# Patient Record
(demographics unavailable — no encounter records)

---

## 2024-10-14 NOTE — HISTORY OF PRESENT ILLNESS
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  Chronic intermittent right hip and groin pain for years now but has been essentially constant for the last 3 months. Pain is described as a sharp pain in the right groin radiating to medial and anterior thigh sharp 10/10 exacerbated by movement, weight bearing, applies cold packs, APAP which helps a little.  + electric sensation from hip down to foot    She is seeing chiropractor for lower back pain.  She has previously seen Dr. Good who ordered MRI of the lumbar spine which was completed at [facility] radiology but has not followed up with it.  Allergies:  Reglan  Prior medications: Current medications : Nifedipine, Carvedilol, diazepam, Ozempic, Pantoprazole , prednisone, gabapentin 400mg in am and 600mg q hs, APAP 500mg 2x/day   Social Hx : She is single and lives in [place].  She has 32 y/o daughter. Retired disability- [place] [employer], as well as  BI.  Denies illivits, etoh, tobacco.

## 2024-10-14 NOTE — ASSESSMENT
[FreeTextEntry1] : 66-year-old female with multiple medical comorbidities including anxiety, aortic stenosis, obstructive sleep apnea, AVN of the right hip status post right total hip replacement, ESRD status post renal transplant, ulnar artery aneurysm status postsurgical repair with ulnar neuropathy who presents for initial evaluation of chronic pain in lower back as well as right groin and anterior thigh.  On physical exam patient with right lower lumbar and sacral paraspinal muscle tenderness with spasm and trigger points.  Negative straight leg raise.   Discussed in detail the nature of chronic pain as well as treatment options including nonopioid pain management PT, therapeutic massage, stretching, exercise program, acupuncture, CBT as well as topical medications, non-opioid pain medications, Trigger point injections, SYD.  -Obtained prior MRI of the lumbar spine from [facility] which showed marked central canal stenosis and moderate bilateral foraminal stenosis L2-L3 through L5-S1 -Given complex medical history as well as above physical exam findings recommend conservative management at this time -Recommend physical therapy as outpatient, avoid NSAIDs -Taking gabapentin 400 mg in a.m. and 600 mg at bedtime without significant improvement in her symptoms, consider possibly tapering off May consider trial of duloxetine Will consider trigger point injections Recommend follow-up with Dr. Good -Will consider referral to general neurology for neuropathy evaluation as well as possible EMG of lower extremities to rule out lumbar radiculopathy   The patient had the opportunity to ask questions and all were answered to their satisfaction.  The patient verbalized understanding of the management plan and agreed with our recommendations.

## 2024-10-14 NOTE — DATA REVIEWED
[FreeTextEntry1] : MRI of lumbar spine March 17, 2024 performed at [facility] radiology: Impression:  1.  Grade 1 listhesis at L5-S1.  2. Disc bulges from L2-L3 through L5-S1 with marked central canal stenosis and moderate bilateral foraminal stenosis. 3.  Epidural lipomatosis contributes to central canal stenosis L4-5 and L5-S1

## 2024-10-14 NOTE — REVIEW OF SYSTEMS
[Arthralgias] : arthralgias [Joint Pain] : joint pain [Joint Stiffness] : joint stiffness [Lower Back Pain] : lower back pain [As Noted in HPI] : as noted in HPI [Negative] : Heme/Lymph

## 2024-10-14 NOTE — PHYSICAL EXAM
[General Appearance - Alert] : alert [General Appearance - Well Nourished] : well nourished [General Appearance - Well Developed] : well developed [Oriented To Time, Place, And Person] : oriented to person, place, and time [Cranial Nerves Optic (II)] : visual acuity intact bilaterally,  visual fields full to confrontation, pupils equal round and reactive to light [Cranial Nerves Oculomotor (III)] : extraocular motion intact [Cranial Nerves Trigeminal (V)] : facial sensation intact symmetrically [Cranial Nerves Facial (VII)] : face symmetrical [Cranial Nerves Vestibulocochlear (VIII)] : hearing was intact bilaterally [Cranial Nerves Glossopharyngeal (IX)] : tongue and palate midline [Cranial Nerves Hypoglossal (XII)] : there was no tongue deviation with protrusion [Cranial Nerves Accessory (XI - Cranial And Spinal)] : head turning and shoulder shrug symmetric [Motor Tone] : muscle tone was normal in all four extremities [Motor Handedness Right-Handed] : the patient is right hand dominant [Sclera] : the sclera and conjunctiva were normal [Outer Ear] : the ears and nose were normal in appearance [Neck Appearance] : the appearance of the neck was normal [Respiration, Rhythm And Depth] : normal respiratory rhythm and effort [Heart Rate And Rhythm] : heart rate was normal and rhythm regular [Motor Strength Lower Extremities Bilaterally] : strength was normal in both lower extremities [FreeTextEntry6] : Right upper extremity weakness secondary to?  Effort predominant digit abduction weakness [FreeTextEntry7] : Decreased sensation to pinprick light touch right ulnar distribution, bilateral lower extremities inconsistent [FreeTextEntry1] : Antalgic gait with forward flexed posture requiring use of rolling walker

## 2024-12-05 NOTE — PHYSICAL EXAM
[General Appearance - Alert] : alert [General Appearance - Well Nourished] : well nourished [General Appearance - Well Developed] : well developed [Sclera] : the sclera and conjunctiva were normal [PERRL With Normal Accommodation] : pupils were equal in size, round, and reactive to light [Extraocular Movements] : extraocular movements were intact [Outer Ear] : the ears and nose were normal in appearance [Hearing Threshold Finger Rub Not [place]] : hearing was normal [Examination Of The Oral Cavity] : the lips and gums were normal [Neck Appearance] : the appearance of the neck was normal [Neck Cervical Mass (___cm)] : no neck mass was observed [Heart Rate And Rhythm] : heart rate was normal and rhythm regular [Heart Sounds] : normal S1 and S2 [Full Pulse] : the pedal pulses are present [Edema] : there was no peripheral edema [Bowel Sounds] : normal bowel sounds [Abdomen Soft] : soft [Abdomen Tenderness] : non-tender [Cervical Lymph Nodes Enlarged Posterior Bilaterally] : posterior cervical [Cervical Lymph Nodes Enlarged Anterior Bilaterally] : anterior cervical [Supraclavicular Lymph Nodes Enlarged Bilaterally] : supraclavicular [Abnormal Walk] : normal gait [Nail Clubbing] : no clubbing  or cyanosis of the fingernails [Musculoskeletal - Swelling] : no joint swelling seen [Skin Color & Pigmentation] : normal skin color and pigmentation [] : no rash [Skin Lesions] : no skin lesions [Cranial Nerves] : cranial nerves 2-12 were intact [No Focal Deficits] : no focal deficits [Oriented To Time, Place, And Person] : oriented to person, place, and time [Impaired Insight] : insight and judgment were intact [Mood] : the mood was normal [FreeTextEntry1] : right arm ulnar aspect with staples

## 2024-12-05 NOTE — PLAN
[FreeTextEntry1] : 1.  Renal transplant - kidney functioning well at time of discharge, creatinine about 1.5 at baseline 2.  Immunosuppression - thymo induction, target Envarsus 4-6, MMF 500mgs BID and pred 2.5.   3.  HTN - Resume coreg but at 12.5mgs BID and asked pt to take nifedipine at the same time per day.   4.  Ulnar artery aneurysm - s/p repair by vascular.  has seen vascular and s/p surgery in [facility].  Pain has improved.   5.  epigastric pain - has sig history of bleeding ulcers.  Pain has improved.  Cont protonix BID and carafate.  She has seen GI and had a f/u EGD and colonoscopy.   6.  Headache - has seen neurology.  On B6 and magnesium.  Possibly worse due to ? sildenafil.   7.  Fatigue/ MOORE -  Pt saw Dr. Alegre.  She is on sildenafil and instructed to start CPAP.  She is reluctant to ligation of AVF right now but if her symptoms do not improve she will get it done.      Breathing has improved 8.  Obesity/ DM - started ozempic, stopped due to constipation.  Then Mounjaro but due to GI intolerance stopped them both.   9.  Hip pain - following with ortho.  Has history of hip surgery on the same side in the past.  Pain has not improved.  Going to see someone in [facility].    She will be scheduling a f/u with Dr. Wadsworth.    f/u with transplant in 4 months.

## 2024-12-05 NOTE — CONSULT LETTER
[Dear  ___] : Dear  [unfilled], [Consult Letter:] : I had the pleasure of evaluating your patient, [unfilled]. [Please see my note below.] : Please see my note below. [Consult Closing:] : Thank you very much for allowing me to participate in the care of this patient.  If you have any questions, please do not hesitate to contact me. [Sincerely,] : Sincerely, [FreeTextEntry3] : Galdino Kimbrough, DO Initial (On Arrival)

## 2024-12-05 NOTE — HISTORY OF PRESENT ILLNESS
[ Donor] :  donor [Thymoglobulin] : thymoglobulin [Negative/Negative] : Donor Negative/Recipient Negative [] : Yes [Terminal Creatinine: ____] : Terminal Creatinine: [unfilled] [KDPI: ____] : Kidney Donor Profile Index: [unfilled] [TextBox_52] : OPO: MAOB\par  UNOS ID: [id number]\par  Match ID: [id number]\par  ABO: O\par  PHS Known Risk: None \par  COD: Anoxia  \par  KDPI: 58%\par  BDD/DCD: BDD\par  Terminal Creat: 0.8\par  Covid Testing: Negative\par  CMV IgG: Negative\par  EBV IgG: POSITIVE  \par  HCV: Negative\par  HCV FRANCISCO: Negative \par   [FreeTextEntry4] : CPRA: 82% \par  CMV IgG Status: Negative\par  EBV IgG Status: POSITIVE \par   [FreeTextEntry1] : 65y/o/f s/p DDRT to the RLQ (1a/1v/1u + stent) on 5/9/23 with Thymoglobulin induction with post op complication of RUE pseudoaneurysm. s/p repair of RUE PSA.   Pt admitted 9/28-10/6 for dyspnea.   9/29 ECHO 9/29 with mild AS 9/30 VQ scan and LE dopplers negative 10/2 UE duplex to assess AVF flow; no significant findings 10/2 CT chest: mosaic attentuations of lungs 10/4 RHC with moderate to severe pHTN She was started on sildenafil.  Since discharge she still has dyspnea.  She has followed up with pulmonary and is being evaluated for sleep apnea.  [de-identified] :  Pt is having a lot of right and groin pain.  Has been admitted twice for it.  Has hx of right TKR.  Has seen ortho and had an MRI of back and hip last week at [facility].  Did not take BP meds yet today.     Still having hip pain.  Pt had an endoscopy and colonoscopy, has a hiatal hernia.  Had a polyp removed.  Also feels cold and tired.  Has not been taking coreg, has not taken nifedipine consistently.  Stopped mounjaro.

## 2025-04-07 NOTE — PHYSICAL EXAM
[General Appearance - Alert] : alert [General Appearance - Well Nourished] : well nourished [General Appearance - Well Developed] : well developed [Sclera] : the sclera and conjunctiva were normal [PERRL With Normal Accommodation] : pupils were equal in size, round, and reactive to light [Extraocular Movements] : extraocular movements were intact [Outer Ear] : the ears and nose were normal in appearance [Hearing Threshold Finger Rub Not [place]] : hearing was normal [Examination Of The Oral Cavity] : the lips and gums were normal [Neck Appearance] : the appearance of the neck was normal [Neck Cervical Mass (___cm)] : no neck mass was observed [Heart Rate And Rhythm] : heart rate was normal and rhythm regular [Heart Sounds] : normal S1 and S2 [Full Pulse] : the pedal pulses are present [Edema] : there was no peripheral edema [Bowel Sounds] : normal bowel sounds [Abdomen Soft] : soft [Abdomen Tenderness] : non-tender [Cervical Lymph Nodes Enlarged Posterior Bilaterally] : posterior cervical [Cervical Lymph Nodes Enlarged Anterior Bilaterally] : anterior cervical [Supraclavicular Lymph Nodes Enlarged Bilaterally] : supraclavicular [Abnormal Walk] : normal gait [Nail Clubbing] : no clubbing  or cyanosis of the fingernails [Musculoskeletal - Swelling] : no joint swelling seen [Skin Color & Pigmentation] : normal skin color and pigmentation [] : no rash [Skin Lesions] : no skin lesions [Cranial Nerves] : cranial nerves 2-12 were intact [No Focal Deficits] : no focal deficits [Oriented To Time, Place, And Person] : oriented to person, place, and time [Impaired Insight] : insight and judgment were intact [Mood] : the mood was normal [FreeTextEntry1] : right arm ulnar aspect with staples

## 2025-04-07 NOTE — CONSULT LETTER
[Dear  ___] : Dear  [unfilled], [Consult Letter:] : I had the pleasure of evaluating your patient, [unfilled]. [Please see my note below.] : Please see my note below. [Consult Closing:] : Thank you very much for allowing me to participate in the care of this patient.  If you have any questions, please do not hesitate to contact me. [Sincerely,] : Sincerely, [FreeTextEntry3] : Galdino Kimbrough, DO

## 2025-04-07 NOTE — HISTORY OF PRESENT ILLNESS
[ Donor] :  donor [Thymoglobulin] : thymoglobulin [Negative/Negative] : Donor Negative/Recipient Negative [] : Yes [Terminal Creatinine: ____] : Terminal Creatinine: [unfilled] [KDPI: ____] : Kidney Donor Profile Index: [unfilled] [TextBox_52] : OPO: MAOB\par  UNOS ID: [id number]\par  Match ID: [id number]\par  ABO: O\par  PHS Known Risk: None \par  COD: Anoxia  \par  KDPI: 58%\par  BDD/DCD: BDD\par  Terminal Creat: 0.8\par  Covid Testing: Negative\par  CMV IgG: Negative\par  EBV IgG: POSITIVE  \par  HCV: Negative\par  HCV FRANCISCO: Negative \par   [FreeTextEntry4] : CPRA: 82% \par  CMV IgG Status: Negative\par  EBV IgG Status: POSITIVE \par   [FreeTextEntry1] : 63y/o/f s/p DDRT to the RLQ (1a/1v/1u + stent) on 5/9/23 with Thymoglobulin induction with post op complication of RUE pseudoaneurysm. s/p repair of RUE PSA.   Pt admitted 9/28-10/6 for dyspnea.   9/29 ECHO 9/29 with mild AS 9/30 VQ scan and LE dopplers negative 10/2 UE duplex to assess AVF flow; no significant findings 10/2 CT chest: mosaic attentuations of lungs 10/4 RHC with moderate to severe pHTN She was started on sildenafil.  Since discharge she still has dyspnea.  She has followed up with pulmonary and is being evaluated for sleep apnea.   Has hx of right TKR.  Has seen ortho and had an MRI of back and hip last week at [facility].  Pt had an endoscopy and colonoscopy 11/2024, has a hiatal hernia.  Had a polyp removed.  Also feels cold and tired.    [de-identified] : Pt still has occasional nausea vomiting.  Has lost some weight.  Still has heartburn, still has hip pain and weakness of right hand.  Stopped her coreg last week.  last night BP felt high and started having palpitations which resolved later.

## 2025-04-07 NOTE — PLAN
[FreeTextEntry1] : 1.  Renal transplant - Creatinine 1.1 today.  Usually about 1.3. 2.  Immunosuppression - thymo induction, target Envarsus 4-6, MMF 500mgs BID and pred 2.5.   3.  HTN/ palpitations - Resume coreg but at 12.5mgs BID and asked pt to take nifedipine at the same time per day.  As pt nervous suggest starting at 6.25mgs BID.  4.  Ulnar artery aneurysm - s/p repair by vascular.  has seen vascular and s/p surgery in [facility].  Pain has improved but hand still weak.   5.  epigastric pain/ heartburn - has sig history of bleeding ulcers.  Pain has improved.  Cont protonix BID and carafate.  She has seen GI and had a f/u EGD and colonoscopy.  She is now on Voquenza, works but very expensive so only taking it prn.  GI is Dr. Petty.   6.  Headache - has seen neurology.  On B6 and magnesium.  Possibly worse due to ? sildenafil.   7.  Fatigue/ MOORE -  Pt saw Dr. Alegre.  She is on sildenafil and instructed to start CPAP.  She is reluctant to ligation of AVF right now but if her symptoms do not improve she will get it done.      Breathing has improved 8.  Obesity/ DM - started ozempic, stopped due to constipation.  Then Mounjaro but due to GI intolerance stopped them both.   9.  Hip pain - following with ortho.  Has history of hip surgery on the same side in the past.  Pain has not improved.  Has seen several orthopedists who have not been able to help.    She will be scheduling a f/u with Dr. Wadsworth.    f/u with transplant in 4 months.

## 2025-07-14 NOTE — HISTORY OF PRESENT ILLNESS
[ Donor] :  donor [Thymoglobulin] : thymoglobulin [Negative/Negative] : Donor Negative/Recipient Negative [] : Yes [Terminal Creatinine: ____] : Terminal Creatinine: [unfilled] [KDPI: ____] : Kidney Donor Profile Index: [unfilled] [TextBox_52] : OPO: MAOB\par  UNOS ID: [id number]\par  Match ID: [id number]\par  ABO: O\par  PHS Known Risk: None \par  COD: Anoxia  \par  KDPI: 58%\par  BDD/DCD: BDD\par  Terminal Creat: 0.8\par  Covid Testing: Negative\par  CMV IgG: Negative\par  EBV IgG: POSITIVE  \par  HCV: Negative\par  HCV FRANCISCO: Negative \par   [FreeTextEntry4] : CPRA: 82% \par  CMV IgG Status: Negative\par  EBV IgG Status: POSITIVE \par   [FreeTextEntry1] : 65y/o/f s/p DDRT to the RLQ (1a/1v/1u + stent) on 5/9/23 with Thymoglobulin induction with post op complication of RUE pseudoaneurysm. s/p repair of RUE PSA.   Pt admitted 9/28-10/6 for dyspnea.   9/29 ECHO 9/29 with mild AS 9/30 VQ scan and LE dopplers negative 10/2 UE duplex to assess AVF flow; no significant findings 10/2 CT chest: mosaic attentuations of lungs 10/4 RHC with moderate to severe pHTN She was started on sildenafil.  Since discharge she still has dyspnea.  She has followed up with pulmonary and is being evaluated for sleep apnea.   Has hx of right TKR.  Has seen ortho and had an MRI of back and hip last week at [facility].  Pt had an endoscopy and colonoscopy 11/2024, has a hiatal hernia.  Had a polyp removed.  Also feels cold and tired.    [de-identified] : Pt still has dyspnea on exertion.  Has lost some weight.  Still has hip pain and weakness of right hand.  Kidney function has been stable. Blood pressure well controlled.

## 2025-07-14 NOTE — PHYSICAL EXAM
[General Appearance - Alert] : alert [General Appearance - Well Nourished] : well nourished [General Appearance - Well Developed] : well developed [Sclera] : the sclera and conjunctiva were normal [PERRL With Normal Accommodation] : pupils were equal in size, round, and reactive to light [Extraocular Movements] : extraocular movements were intact [Outer Ear] : the ears and nose were normal in appearance [Hearing Threshold Finger Rub Not [place]] : hearing was normal [Examination Of The Oral Cavity] : the lips and gums were normal [Neck Appearance] : the appearance of the neck was normal [Neck Cervical Mass (___cm)] : no neck mass was observed [Heart Rate And Rhythm] : heart rate was normal and rhythm regular [Heart Sounds] : normal S1 and S2 [Full Pulse] : the pedal pulses are present [Edema] : there was no peripheral edema [Bowel Sounds] : normal bowel sounds [Abdomen Soft] : soft [Abdomen Tenderness] : non-tender [Cervical Lymph Nodes Enlarged Posterior Bilaterally] : posterior cervical [Cervical Lymph Nodes Enlarged Anterior Bilaterally] : anterior cervical [Supraclavicular Lymph Nodes Enlarged Bilaterally] : supraclavicular [Abnormal Walk] : normal gait [Nail Clubbing] : no clubbing  or cyanosis of the fingernails [Musculoskeletal - Swelling] : no joint swelling seen [Skin Color & Pigmentation] : normal skin color and pigmentation [] : no rash [Skin Lesions] : no skin lesions [Cranial Nerves] : cranial nerves 2-12 were intact [No Focal Deficits] : no focal deficits [Oriented To Time, Place, And Person] : oriented to person, place, and time [Impaired Insight] : insight and judgment were intact [Mood] : the mood was normal [FreeTextEntry1] : right arm ulnar aspect with staples

## 2025-07-14 NOTE — PLAN
[FreeTextEntry1] : 1.  Renal transplant - Creatinine 1.1 today.  Usually about 1.3. 2.  Immunosuppression - thymo induction, target Envarsus 4-6, MMF 500mgs BID and pred 2.5.  Will stop prednisone as she has signification weight gain and bone pain/ issues that may be related to chronic steroid use.  Will need to repeat labs in 1 month.   3.  HTN- BP now well controlled.  4.  Ulnar artery aneurysm - s/p repair by vascular.  has seen vascular and s/p surgery in [facility].  Pain has improved but hand still weak.   5.  epigastric pain/ heartburn - has sig history of bleeding ulcers.  Pain has improved.  Cont protonix BID and carafate.  She has seen GI and had a f/u EGD and colonoscopy.  She is now on Voquenza, works but very expensive so only taking it prn.  GI is Dr. Petty.   6.  Headache - has seen neurology.  On B6 and magnesium.  Possibly worse due to sildenafil.   7.  Fatigue/ MOORE -  Pt saw Dr. Alegre in the past, asked her to schedule f/u.  She is on sildenafil and instructed to start CPAP.  She is reluctant to ligation of AVF right now but if her symptoms do not improve she will get it done.      8.  Obesity/ DM - started ozempic, stopped due to constipation.  Then Mounjaro but due to GI intolerance stopped them both.  Needs to stop eating snacks.  Will stop prednisone.  9.  Hip pain - following with ortho.  Has history of hip surgery on the same side in the past.  Pain has not improved.  Has seen several orthopedists who have not been able to help.    She will be scheduling a f/u with Dr. Wadsworth.    f/u with transplant in 3 months and labs in 1 month as prednisone being stopped.   Stop prednisone.